# Patient Record
Sex: MALE | Race: WHITE | NOT HISPANIC OR LATINO | URBAN - METROPOLITAN AREA
[De-identification: names, ages, dates, MRNs, and addresses within clinical notes are randomized per-mention and may not be internally consistent; named-entity substitution may affect disease eponyms.]

---

## 2017-11-15 ENCOUNTER — EMERGENCY (EMERGENCY)
Facility: HOSPITAL | Age: 27
LOS: 0 days | Discharge: HOME | End: 2017-11-15
Attending: EMERGENCY MEDICINE | Admitting: EMERGENCY MEDICINE
Payer: SELF-PAY

## 2017-11-15 VITALS
TEMPERATURE: 99 F | SYSTOLIC BLOOD PRESSURE: 137 MMHG | HEART RATE: 87 BPM | DIASTOLIC BLOOD PRESSURE: 95 MMHG | HEIGHT: 75 IN | OXYGEN SATURATION: 95 % | WEIGHT: 246.92 LBS

## 2017-11-15 VITALS — HEIGHT: 75 IN | WEIGHT: 220.02 LBS

## 2017-11-15 PROCEDURE — 99283 EMERGENCY DEPT VISIT LOW MDM: CPT | Mod: 25

## 2017-11-15 RX ORDER — CIPROFLOXACIN HCL 0.3 %
1 DROPS OPHTHALMIC (EYE) ONCE
Qty: 0 | Refills: 0 | Status: COMPLETED | OUTPATIENT
Start: 2017-11-15 | End: 2017-11-15

## 2017-11-15 RX ADMIN — Medication 1 DROP(S): at 02:18

## 2017-11-15 NOTE — ED ADULT TRIAGE NOTE - CHIEF COMPLAINT QUOTE
right eye foreign object, patient thinks there is old contact lens behind right eyelid unable to reach to pull out. wears daily contacts. patient reports worsening pain to right eye today. unsure how old contact lens is

## 2017-11-15 NOTE — ED ADULT NURSE NOTE - OBJECTIVE STATEMENT
Pt states he thinks he has a foreign body in his right eye.  States he believes that he has an old contact in his eye, unsure how long it has been stuck in the eye for.

## 2017-11-15 NOTE — ED PROVIDER NOTE - EYES, MLM
Clear bilaterally, pupils equal, round and reactive to light.  NO corneal abrasion on flourescein exam.  (+) excoriation to Rt upper inner lid with irritation to palbabral conjunctiva.  NL visual acuity.
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
## 2017-11-16 DIAGNOSIS — S05.01XA INJURY OF CONJUNCTIVA AND CORNEAL ABRASION WITHOUT FOREIGN BODY, RIGHT EYE, INITIAL ENCOUNTER: ICD-10-CM
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
## 2018-07-24 ENCOUNTER — EMERGENCY (EMERGENCY)
Facility: HOSPITAL | Age: 28
LOS: 0 days | Discharge: HOME | End: 2018-07-24
Attending: EMERGENCY MEDICINE
Payer: MEDICAID

## 2018-07-24 VITALS
HEIGHT: 76 IN | RESPIRATION RATE: 17 BRPM | SYSTOLIC BLOOD PRESSURE: 150 MMHG | TEMPERATURE: 98 F | DIASTOLIC BLOOD PRESSURE: 113 MMHG | WEIGHT: 270.07 LBS | HEART RATE: 104 BPM

## 2018-07-24 VITALS
TEMPERATURE: 98 F | SYSTOLIC BLOOD PRESSURE: 164 MMHG | RESPIRATION RATE: 18 BRPM | DIASTOLIC BLOOD PRESSURE: 100 MMHG | OXYGEN SATURATION: 100 % | HEART RATE: 94 BPM

## 2018-07-24 DIAGNOSIS — F17.210 NICOTINE DEPENDENCE, CIGARETTES, UNCOMPLICATED: ICD-10-CM

## 2018-07-24 DIAGNOSIS — R41.82 ALTERED MENTAL STATUS, UNSPECIFIED: ICD-10-CM

## 2018-07-24 DIAGNOSIS — R53.1 WEAKNESS: ICD-10-CM

## 2018-07-24 LAB
ALBUMIN SERPL ELPH-MCNC: 4.5 G/DL — SIGNIFICANT CHANGE UP (ref 3.3–5)
ALP SERPL-CCNC: 78 U/L — SIGNIFICANT CHANGE UP (ref 40–120)
ALT FLD-CCNC: 23 U/L — SIGNIFICANT CHANGE UP (ref 12–78)
ANION GAP SERPL CALC-SCNC: 8 MMOL/L — SIGNIFICANT CHANGE UP (ref 5–17)
APTT BLD: 29.5 SEC — SIGNIFICANT CHANGE UP (ref 27.5–37.4)
AST SERPL-CCNC: 24 U/L — SIGNIFICANT CHANGE UP (ref 15–37)
BASOPHILS # BLD AUTO: 0.05 K/UL — SIGNIFICANT CHANGE UP (ref 0–0.2)
BASOPHILS NFR BLD AUTO: 0.4 % — SIGNIFICANT CHANGE UP (ref 0–2)
BILIRUB SERPL-MCNC: 0.4 MG/DL — SIGNIFICANT CHANGE UP (ref 0.2–1.2)
BUN SERPL-MCNC: 20 MG/DL — SIGNIFICANT CHANGE UP (ref 7–23)
CALCIUM SERPL-MCNC: 9 MG/DL — SIGNIFICANT CHANGE UP (ref 8.5–10.1)
CHLORIDE SERPL-SCNC: 102 MMOL/L — SIGNIFICANT CHANGE UP (ref 96–108)
CK SERPL-CCNC: 188 U/L — SIGNIFICANT CHANGE UP (ref 26–308)
CO2 SERPL-SCNC: 26 MMOL/L — SIGNIFICANT CHANGE UP (ref 22–31)
CREAT SERPL-MCNC: 1.45 MG/DL — HIGH (ref 0.5–1.3)
EOSINOPHIL # BLD AUTO: 0.06 K/UL — SIGNIFICANT CHANGE UP (ref 0–0.5)
EOSINOPHIL NFR BLD AUTO: 0.4 % — SIGNIFICANT CHANGE UP (ref 0–6)
ETHANOL SERPL-MCNC: <10 MG/DL — SIGNIFICANT CHANGE UP (ref 0–10)
GLUCOSE SERPL-MCNC: 106 MG/DL — HIGH (ref 70–99)
HCT VFR BLD CALC: 44.7 % — SIGNIFICANT CHANGE UP (ref 39–50)
HGB BLD-MCNC: 14.4 G/DL — SIGNIFICANT CHANGE UP (ref 13–17)
IMM GRANULOCYTES NFR BLD AUTO: 0.4 % — SIGNIFICANT CHANGE UP (ref 0–1.5)
INR BLD: 1.09 RATIO — SIGNIFICANT CHANGE UP (ref 0.88–1.16)
LACTATE SERPL-SCNC: 0.9 MMOL/L — SIGNIFICANT CHANGE UP (ref 0.7–2)
LYMPHOCYTES # BLD AUTO: 1.53 K/UL — SIGNIFICANT CHANGE UP (ref 1–3.3)
LYMPHOCYTES # BLD AUTO: 11.3 % — LOW (ref 13–44)
MCHC RBC-ENTMCNC: 28.8 PG — SIGNIFICANT CHANGE UP (ref 27–34)
MCHC RBC-ENTMCNC: 32.2 GM/DL — SIGNIFICANT CHANGE UP (ref 32–36)
MCV RBC AUTO: 89.4 FL — SIGNIFICANT CHANGE UP (ref 80–100)
MONOCYTES # BLD AUTO: 1.75 K/UL — HIGH (ref 0–0.9)
MONOCYTES NFR BLD AUTO: 12.9 % — SIGNIFICANT CHANGE UP (ref 2–14)
NEUTROPHILS # BLD AUTO: 10.12 K/UL — HIGH (ref 1.8–7.4)
NEUTROPHILS NFR BLD AUTO: 74.6 % — SIGNIFICANT CHANGE UP (ref 43–77)
NRBC # BLD: 0 /100 WBCS — SIGNIFICANT CHANGE UP (ref 0–0)
PLATELET # BLD AUTO: 335 K/UL — SIGNIFICANT CHANGE UP (ref 150–400)
POTASSIUM SERPL-MCNC: 4.2 MMOL/L — SIGNIFICANT CHANGE UP (ref 3.5–5.3)
POTASSIUM SERPL-SCNC: 4.2 MMOL/L — SIGNIFICANT CHANGE UP (ref 3.5–5.3)
PROT SERPL-MCNC: 9 GM/DL — HIGH (ref 6–8.3)
PROTHROM AB SERPL-ACNC: 11.9 SEC — SIGNIFICANT CHANGE UP (ref 9.8–12.7)
RBC # BLD: 5 M/UL — SIGNIFICANT CHANGE UP (ref 4.2–5.8)
RBC # FLD: 13.2 % — SIGNIFICANT CHANGE UP (ref 10.3–14.5)
SODIUM SERPL-SCNC: 136 MMOL/L — SIGNIFICANT CHANGE UP (ref 135–145)
WBC # BLD: 13.57 K/UL — HIGH (ref 3.8–10.5)
WBC # FLD AUTO: 13.57 K/UL — HIGH (ref 3.8–10.5)

## 2018-07-24 PROCEDURE — 93010 ELECTROCARDIOGRAM REPORT: CPT

## 2018-07-24 PROCEDURE — 70450 CT HEAD/BRAIN W/O DYE: CPT | Mod: 26

## 2018-07-24 PROCEDURE — 71045 X-RAY EXAM CHEST 1 VIEW: CPT | Mod: 26

## 2018-07-24 PROCEDURE — 99284 EMERGENCY DEPT VISIT MOD MDM: CPT

## 2018-07-24 RX ORDER — NALOXONE HYDROCHLORIDE 4 MG/.1ML
0.4 SPRAY NASAL ONCE
Qty: 0 | Refills: 0 | Status: COMPLETED | OUTPATIENT
Start: 2018-07-24 | End: 2018-07-24

## 2018-07-24 RX ORDER — SODIUM CHLORIDE 9 MG/ML
1000 INJECTION INTRAMUSCULAR; INTRAVENOUS; SUBCUTANEOUS ONCE
Qty: 0 | Refills: 0 | Status: COMPLETED | OUTPATIENT
Start: 2018-07-24 | End: 2018-07-24

## 2018-07-24 RX ORDER — SODIUM CHLORIDE 9 MG/ML
2000 INJECTION INTRAMUSCULAR; INTRAVENOUS; SUBCUTANEOUS ONCE
Qty: 0 | Refills: 0 | Status: COMPLETED | OUTPATIENT
Start: 2018-07-24 | End: 2018-07-24

## 2018-07-24 RX ADMIN — SODIUM CHLORIDE 2000 MILLILITER(S): 9 INJECTION INTRAMUSCULAR; INTRAVENOUS; SUBCUTANEOUS at 14:33

## 2018-07-24 NOTE — ED ADULT TRIAGE NOTE - CHIEF COMPLAINT QUOTE
lethargic reported by co-workers. He admits to sniffed cocaine and heroin  before going to work. He was found awake no treatment necessary at the scene

## 2018-07-24 NOTE — ED PROVIDER NOTE - OBJECTIVE STATEMENT
26 yo male presents with altered mental status today while at work. Patient states that he took lexapro this morning 10mg. Patient drug, alcohol use today. Patient denies trauma, environmental exposure, history of similar.

## 2018-07-24 NOTE — ED PROVIDER NOTE - MEDICAL DECISION MAKING DETAILS
Patient with acute alcohol intoxication, requesting rehab. patient brought to ED for evaluation of profuse sweating at work today; family, with steady gait, family at bedside,,will discharge with pmd f/u

## 2018-07-24 NOTE — ED ADULT NURSE REASSESSMENT NOTE - NS ED NURSE REASSESS COMMENT FT1
pt seen and re-evaluated by ED attending d/c ready in stable condition pt instructed to return to the ED if symptoms return or f/u with pmd for f/u care3 pt left ED ambulatory in no acute distress.

## 2018-07-29 LAB
CULTURE RESULTS: SIGNIFICANT CHANGE UP
CULTURE RESULTS: SIGNIFICANT CHANGE UP
SPECIMEN SOURCE: SIGNIFICANT CHANGE UP
SPECIMEN SOURCE: SIGNIFICANT CHANGE UP

## 2018-09-22 ENCOUNTER — EMERGENCY (EMERGENCY)
Facility: HOSPITAL | Age: 28
LOS: 0 days | Discharge: HOME | End: 2018-09-22
Attending: EMERGENCY MEDICINE | Admitting: EMERGENCY MEDICINE
Payer: MEDICAID

## 2018-09-22 VITALS
SYSTOLIC BLOOD PRESSURE: 139 MMHG | OXYGEN SATURATION: 100 % | WEIGHT: 199.96 LBS | HEART RATE: 101 BPM | TEMPERATURE: 98 F | DIASTOLIC BLOOD PRESSURE: 100 MMHG | HEIGHT: 70 IN | RESPIRATION RATE: 18 BRPM

## 2018-09-22 DIAGNOSIS — T40.2X1A POISONING BY OTHER OPIOIDS, ACCIDENTAL (UNINTENTIONAL), INITIAL ENCOUNTER: ICD-10-CM
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
## 2018-09-22 PROBLEM — F32.9 MAJOR DEPRESSIVE DISORDER, SINGLE EPISODE, UNSPECIFIED: Chronic | Status: ACTIVE | Noted: 2018-07-24

## 2018-09-22 PROCEDURE — 99285 EMERGENCY DEPT VISIT HI MDM: CPT

## 2018-09-22 NOTE — ED ADULT NURSE NOTE - CHPI ED NUR SYMPTOMS NEG
no chills/no dizziness/no vomiting/no decreased eating/drinking/no nausea/no pain/no tingling/no weakness

## 2018-09-22 NOTE — ED ADULT NURSE NOTE - OBJECTIVE STATEMENT
patient given narcan by mother prior to arrival, took oxycontin earlier last night. patient alert and oriented x 4 on arrival.

## 2018-09-22 NOTE — ED PROVIDER NOTE - MEDICAL DECISION MAKING DETAILS
Pt took percocet approximately 6 hours PTA.  Pt is alert and oriented and able to ambulate.  Will discharge with his father.

## 2018-09-22 NOTE — ED ADULT TRIAGE NOTE - CHIEF COMPLAINT QUOTE
EMS reports that patient was sleeping and mother gave him 4 mg IN Narcan. Mother reports patient took "some" oral Percocet.

## 2018-09-22 NOTE — ED PROVIDER NOTE - OBJECTIVE STATEMENT
28 y/o male with h/o drug abuse and addiction to opioids BIBEMS s/p overdose on 5 Percocets that were sold on the street.  He states that he took them at 2300-midnight.  He didn't attempt to sleep until about 0530 and his father found him "hallucinating".  Pt states he was started when he was woken up.  Pt given narcan by EMS at the scene.  No resuscitation was required.  Pt alert and oriented currently.  Pt denies any SI and states he took the pills because of addiction.  He is currently in an outpatient rehab.

## 2018-09-22 NOTE — ED ADULT NURSE REASSESSMENT NOTE - NS ED NURSE REASSESS COMMENT FT1
patient alert and oriented resting in bed. respirations even and unlabored. ms strength 5/5 upper and lower extremities bilaterally. patient tolerating PO fluids. dischrged home, written and verbal discharge and followup instructions given to patient, patient verbalized back understanding. patient drinking water and juice, laying in bed. awaiting ride home. all discharge information given to patient.

## 2018-12-20 NOTE — ED PROVIDER NOTE - CARE PLAN
dispo: [facility] once cleared by orthopedics Principal Discharge DX:	Alcohol intoxication Principal Discharge DX:	Altered mental status

## 2020-06-26 NOTE — ED ADULT NURSE NOTE - CAS EDN INTEG ASSESS
May take Ibuprofen after 8:08 pm.      CIRCUMCISION POST OPERATIVE INSTRUCTIONS    PAIN: Your child received pain medication during and right after the procedure. He also received some numbing medication to the area. A small amount of pain from the procedure is normal.  • Acetaminophen (Tylenol) may be given every 4 hours as needed for pain. Please follow the dosing table according to your child's weight. The most common recommended liquid dose is 10 mg per kilogram of your child's weight.  • Ibuprofen (Motrin) may be given every 6-8 hours as needed for pain. It can be given in addition to Tylenol. Please follow the dosing table according to your child's weight. The most common recommended liquid dose is 10 mg per kilogram of your child's weight.  • Acetaminophen with Hydrocodone (Hycet or Norco) may be given for the first few days after procedure, but if he is not having a lot of pain, regular Acetaminophen or Ibuprofen are appropriate. Please do not give Hycet/Norco and Tylenol together, as the Hycet/Norco already contains Tylenol and can cause an overdose. All opiate medications like Hycet/Norco can cause constipation. He may need a medication that will help soften his stool such as Miralax in order to keep his bowels moving well.    Please call our office if he has constant and severe pain even after pain medication.    DRESSING: A multi-layer dressing has been applied to the penis. If the dressing falls off on its own, no other dressings are needed.  • Remove the dressing 2 days after surgery if it has not fallen off on its own. Give pain medication 15 minutes prior to removal. Remove all layers of the dressing. There is a tab of clear tape and an arrow pointing to the direction to unwrap the dressing. The first layer is an elastic bandage and the second layer is a cotton gauze bandage. The dressing can sometimes be dry and stick to the other layers or skin. You may place a water on the dressing to help get it  off. You can do this by placing a very wet towel over the penis and covering with a diaper for 5-10 minutes. You can also pour water directly on to the dressing. Repeat as necessary.    • Once the dressing has been removed, please use Bacitracin ointment three times a day on the head of the penis for 1 week. After 1 week, you should use Vaseline or Aquaphor ointment 2-3 x a day or with each diaper change until seen in postop.    APPEARANCE: The head of the penis will appear red and swollen. This is normal. There also may be some yellow/green soft material at the suture line. It takes a few weeks for the swelling to completely go down to normal; you will start to see major improvement in the first 2 weeks.    BLEEDING: Spots of blood may be noted on the diaper or underwear. If there is active bleeding or oozing from the stitches, apply constant gentle pressure with a clean wash cloth for 5 minutes.   • Please call our office if active bleeding does not stop after 5 minutes of pressure.    URINE OUTPUT: Urine output should be normal, but he may experience some discomfort with urination on the first day.  • Please call our office if he has not voided in 8-12 hours.    FEVER: A low grade fever during the first 24 hours after surgery is normal. Encourage fluids and give acetaminophen for low grade fevers (less than 101 F).  • Please call our office if there is high fever (higher than 101 F) despite acetaminophen use.    ACTIVITY: Your child has had a general anesthetic. He should not be involved today in any activity that requires him to be alert, and he should be carefully monitored.   • He may return to /school once he is no longer taking narcotic pain medications. Older children may require up to a week off in order to recover.    • Avoid rough play, PE and wrestling for the first week. Avoid straddle activities such as biking and straddle bouncers for the first 2 weeks.    BATHING: He should have sponge baths  for the first 3 days, and can resume regular showers/quick baths 3 days after surgery.    DIET: He can resume a regular diet once he tolerates fluids without vomiting.    POST-OPERATIVE VISIT:  Your son should return for post-op check in 4-6 weeks. You will receive a call from the clinic to schedule that appointment. Please call [phone] to confirm or to schedule.    IMPORTANT PHONE NUMBERS:  During Regular Business Hours:  [phone]  During Evenings, Weekends or Holidays:  [phone]; ask for the Pediatric Urologist on call.       WDL

## 2021-08-13 NOTE — ED PROVIDER NOTE - CARDIAC PEDAL EDEMA
CM responded to Code R alert  RECENT ADMISSION:   AGE:66  SEX:M  DX:7/28-7/30 at [facility] for atypical atrial flutter; s/p ablation of mitral annular flutter on 7/28  OUTCOME:dc'd home at that time with OP follow up  RESOURCES ON D/C (previous admission): OP follow up  CURRENT F/U APPTS: saw PCP and was to go today for iron infusion but felt too weak  REASON FOR READMISSION: diarrhea; abd cramping   INTERVENTIONS: Spoke with ED provider, work up pending/checking labs  Likely dc home if no work up is good 
absent